# Patient Record
Sex: MALE | Race: BLACK OR AFRICAN AMERICAN | Employment: FULL TIME | URBAN - METROPOLITAN AREA
[De-identification: names, ages, dates, MRNs, and addresses within clinical notes are randomized per-mention and may not be internally consistent; named-entity substitution may affect disease eponyms.]

---

## 2020-08-16 ENCOUNTER — HOSPITAL ENCOUNTER (EMERGENCY)
Age: 55
Discharge: HOME | End: 2020-08-16
Attending: STUDENT IN AN ORGANIZED HEALTH CARE EDUCATION/TRAINING PROGRAM
Payer: COMMERCIAL

## 2020-08-16 ENCOUNTER — APPOINTMENT (OUTPATIENT)
Dept: GENERAL RADIOLOGY | Age: 55
End: 2020-08-16
Payer: COMMERCIAL

## 2020-08-16 VITALS
TEMPERATURE: 98 F | DIASTOLIC BLOOD PRESSURE: 92 MMHG | RESPIRATION RATE: 12 BRPM | WEIGHT: 165 LBS | BODY MASS INDEX: 25.01 KG/M2 | HEART RATE: 71 BPM | HEIGHT: 68 IN | SYSTOLIC BLOOD PRESSURE: 135 MMHG | OXYGEN SATURATION: 100 %

## 2020-08-16 PROCEDURE — 90471 IMMUNIZATION ADMIN: CPT | Performed by: STUDENT IN AN ORGANIZED HEALTH CARE EDUCATION/TRAINING PROGRAM

## 2020-08-16 PROCEDURE — 73130 X-RAY EXAM OF HAND: CPT

## 2020-08-16 PROCEDURE — 2500000003 HC RX 250 WO HCPCS: Performed by: STUDENT IN AN ORGANIZED HEALTH CARE EDUCATION/TRAINING PROGRAM

## 2020-08-16 PROCEDURE — 99282 EMERGENCY DEPT VISIT SF MDM: CPT

## 2020-08-16 PROCEDURE — 90715 TDAP VACCINE 7 YRS/> IM: CPT | Performed by: STUDENT IN AN ORGANIZED HEALTH CARE EDUCATION/TRAINING PROGRAM

## 2020-08-16 PROCEDURE — 6360000002 HC RX W HCPCS: Performed by: STUDENT IN AN ORGANIZED HEALTH CARE EDUCATION/TRAINING PROGRAM

## 2020-08-16 PROCEDURE — 12001 RPR S/N/AX/GEN/TRNK 2.5CM/<: CPT

## 2020-08-16 RX ORDER — LIDOCAINE HYDROCHLORIDE AND EPINEPHRINE 10; 10 MG/ML; UG/ML
20 INJECTION, SOLUTION INFILTRATION; PERINEURAL ONCE
Status: COMPLETED | OUTPATIENT
Start: 2020-08-16 | End: 2020-08-16

## 2020-08-16 RX ORDER — CEPHALEXIN 500 MG/1
500 CAPSULE ORAL 2 TIMES DAILY
Qty: 6 CAPSULE | Refills: 0 | Status: SHIPPED | OUTPATIENT
Start: 2020-08-16 | End: 2020-08-19

## 2020-08-16 RX ADMIN — TETANUS TOXOID, REDUCED DIPHTHERIA TOXOID AND ACELLULAR PERTUSSIS VACCINE, ADSORBED 0.5 ML: 5; 2.5; 8; 8; 2.5 SUSPENSION INTRAMUSCULAR at 12:38

## 2020-08-16 RX ADMIN — LIDOCAINE HYDROCHLORIDE,EPINEPHRINE BITARTRATE 20 ML: 10; .01 INJECTION, SOLUTION INFILTRATION; PERINEURAL at 13:49

## 2020-08-16 ASSESSMENT — ENCOUNTER SYMPTOMS
ABDOMINAL PAIN: 0
SHORTNESS OF BREATH: 0
EYE REDNESS: 0
RHINORRHEA: 0
SORE THROAT: 0
NAUSEA: 0
EYE DISCHARGE: 0
VOMITING: 0
COLOR CHANGE: 0
COUGH: 0

## 2020-08-16 ASSESSMENT — PAIN SCALES - GENERAL: PAINLEVEL_OUTOF10: 7

## 2020-08-16 NOTE — ED PROVIDER NOTES
[address] [facility]          ATTENDING PHYSICIAN NOTE       Date of evaluation: 8/16/2020    Chief Complaint     Laceration (Pt presents to ED with lac in between pinky and index finder on right hand. Pt was playing baseball and ball split his skin open per patient.   )      History of Present Illness     Titi Tolentino is a 48 y.o. right hand dominant male presents to the ED for a laceration to his right hand. About 2 hours ago, patient was catching a baseball barehanded when it struck his hand and caused \"a split\" in the skin between the 4th and 5th digits. Patient does not recall the date of his last tetanus shot. He still has sensation in the digits and has full ROM. He did not take any medications prior to arrival.    Review of Systems     Review of Systems   Constitutional: Negative for chills and fever. HENT: Negative for rhinorrhea and sore throat. Eyes: Negative for discharge and redness. Respiratory: Negative for cough and shortness of breath. Cardiovascular: Negative for chest pain and leg swelling. Gastrointestinal: Negative for abdominal pain, nausea and vomiting. Genitourinary: Negative for dysuria and hematuria. Musculoskeletal: Negative for arthralgias and myalgias. Skin: Positive for wound. Negative for color change and rash. Neurological: Negative for light-headedness, numbness and headaches. Psychiatric/Behavioral: Negative for agitation and confusion. Past Medical, Surgical, Family, and Social History     He has no past medical history on file. He has no past surgical history on file. His family history is not on file. He reports that he has never smoked. He has never used smokeless tobacco. He reports current alcohol use of about 4.0 standard drinks of alcohol per week. He reports that he does not use drugs.     Medications     Previous Medications    No medications on file       Allergies     He has No Known Allergies. Physical Exam     INITIAL VITALS: BP: (!) 135/92, Temp: 98 °F (36.7 °C), Pulse: 71, Resp: 12, SpO2: 100 %   Physical Exam  Constitutional:       General: He is not in acute distress. Appearance: Normal appearance. HENT:      Head: Normocephalic and atraumatic. Right Ear: External ear normal.      Left Ear: External ear normal.   Eyes:      Extraocular Movements: Extraocular movements intact. Pupils: Pupils are equal, round, and reactive to light. Neck:      Musculoskeletal: Normal range of motion and neck supple. Cardiovascular:      Rate and Rhythm: Normal rate. Pulmonary:      Effort: Pulmonary effort is normal. No respiratory distress. Musculoskeletal:         General: No swelling or deformity. Right hand: He exhibits tenderness and laceration. He exhibits normal range of motion, normal capillary refill and no deformity. Normal sensation noted. Hands:       Comments: 2 cm laceration to web space between 4th and 5th digits of right hand. Patient able to fully extend digits. Able to flex at PIP and DIP and abbduct/adduct digits. Cap refill <2 seconds. No apparent joint capsule involvement. Neurological:      General: No focal deficit present. Mental Status: He is alert and oriented to person, place, and time. Psychiatric:         Mood and Affect: Mood normal.         Behavior: Behavior normal.           Diagnostic Results       RADIOLOGY:  XR HAND RIGHT (MIN 3 VIEWS)   Final Result   1. No acute osseous abnormality. Soft tissue gas as described. LABS:   No results found for this visit on 08/16/20.       RECENT VITALS:  BP: (!) 135/92,Temp: 98 °F (36.7 °C), Pulse: 71, Resp: 12, SpO2: 100 %     Procedures     Lac Repair    Date/Time: 8/16/2020 1:37 PM  Performed by: Matilda Ponce MD  Authorized by: Matilda Ponce MD     Consent:     Consent obtained:  Verbal    Consent given by:  Patient    Risks discussed:  Infection, need for additional repair, poor cosmetic result, pain, poor wound healing, retained foreign body, tendon damage, vascular damage and nerve damage    Alternatives discussed:  No treatment  Anesthesia (see MAR for exact dosages): Anesthesia method:  Local infiltration    Local anesthetic:  Lidocaine 1% WITH epi  Laceration details:     Location:  Hand    Hand location:  R hand, dorsum    Length (cm):  2  Repair type:     Repair type:  Simple  Pre-procedure details:     Preparation:  Imaging obtained to evaluate for foreign bodies  Exploration:     Hemostasis achieved with:  Direct pressure    Wound exploration: wound explored through full range of motion and entire depth of wound probed and visualized      Wound extent: no foreign bodies/material noted, no tendon damage noted and no underlying fracture noted      Contaminated: no    Treatment:     Area cleansed with:  Saline    Amount of cleaning:  Standard    Irrigation solution:  Sterile saline    Irrigation volume:  200 mL    Irrigation method:  Pressure wash    Visualized foreign bodies/material removed: no    Skin repair:     Repair method:  Sutures    Suture size:  3-0    Suture material:  Nylon    Suture technique:  Simple interrupted and vertical mattress    Number of sutures:  8  Approximation:     Approximation:  Close  Post-procedure details:     Dressing:  Non-adherent dressing    Patient tolerance of procedure: Tolerated well, no immediate complications        ED Course     Nursing Notes, Past Medical Hx, Past Surgical Hx, Social Hx,Allergies, and Family Hx were reviewed.     patient was given the following medications:  Orders Placed This Encounter   Medications    Tetanus-Diphth-Acell Pertussis (BOOSTRIX) injection 0.5 mL    lidocaine-EPINEPHrine 1 percent-[id number] injection 20 mL    cephALEXin (KEFLEX) 500 MG capsule     Sig: Take 1 capsule by mouth 2 times daily for 3 days     Dispense:  6 capsule     Refill:  0       CONSULTS:  None    MEDICAL DECISIONMAKING / ASSESSMENT / KLAUS Estrada. is a 48 y.o. male who presents for a right hand laceration after catching a baseball. No apparent tendon or joint involvement. Patient able to fully extend/flex/abbduct/adduct digits. Cap refill intact. Sensation intact. No fracture or foreign body visualized on xray. Tetanus updated. Wound irrigated and repaired per procedure note. Galileo taped fingers for support. Prescription for short course of prophylactic abx as this is patient's dominant hand and he is a . Counseled on wound care and to follow up with either the ED or his PCP in 1 week for wound check and possible suture removal (8 sutures). ED precautions for signs of infection. Clinical Impression     1.  Laceration of right hand without foreign body, initial encounter        Disposition     PATIENT REFERRED TO:  Katherine Arredondo  [address] [place] [address] [place] [id number] [phone]    Go in 1 week      [facility] Emergency Department  [address] [place] [address]  [phone]  In 1 week  If symptoms worsen, For wound re-check, For suture removal      DISCHARGE MEDICATIONS:  New Prescriptions    CEPHALEXIN (KEFLEX) 500 MG CAPSULE    Take 1 capsule by mouth 2 times daily for 3 days       DISPOSITION Discharge - Pending Orders Complete 08/16/2020 01:28:18 PM       Tova Mora MD  08/16/20 [id number]

## 2020-08-16 NOTE — ED TRIAGE NOTES
Pt presents to ED with lac in between pinky and index finder on right hand. Pt was playing baseball and ball split his skin open per patient.